# Patient Record
Sex: MALE | Race: OTHER
[De-identification: names, ages, dates, MRNs, and addresses within clinical notes are randomized per-mention and may not be internally consistent; named-entity substitution may affect disease eponyms.]

---

## 2020-07-20 ENCOUNTER — HOSPITAL ENCOUNTER (EMERGENCY)
Dept: HOSPITAL 8 - ED | Age: 50
LOS: 1 days | Discharge: HOME | End: 2020-07-21
Payer: COMMERCIAL

## 2020-07-20 VITALS — WEIGHT: 184.31 LBS | HEIGHT: 70 IN | BODY MASS INDEX: 26.39 KG/M2

## 2020-07-20 DIAGNOSIS — G45.8: Primary | ICD-10-CM

## 2020-07-20 DIAGNOSIS — R51: ICD-10-CM

## 2020-07-20 DIAGNOSIS — R94.31: ICD-10-CM

## 2020-07-20 DIAGNOSIS — G46.0: ICD-10-CM

## 2020-07-20 LAB
ALBUMIN SERPL-MCNC: 3.6 G/DL (ref 3.4–5)
ALP SERPL-CCNC: 75 U/L (ref 45–117)
ALT SERPL-CCNC: 32 U/L (ref 12–78)
ANION GAP SERPL CALC-SCNC: 9 MMOL/L (ref 5–15)
BASOPHILS # BLD AUTO: 0.15 X10^3/UL (ref 0–0.1)
BASOPHILS NFR BLD AUTO: 2 % (ref 0–1)
BILIRUB SERPL-MCNC: 0.6 MG/DL (ref 0.2–1)
CALCIUM SERPL-MCNC: 8.7 MG/DL (ref 8.5–10.1)
CHLORIDE SERPL-SCNC: 107 MMOL/L (ref 98–107)
CREAT SERPL-MCNC: 0.79 MG/DL (ref 0.7–1.3)
EOSINOPHIL # BLD AUTO: 0.16 X10^3/UL (ref 0–0.4)
EOSINOPHIL NFR BLD AUTO: 2 % (ref 1–7)
ERYTHROCYTE [DISTWIDTH] IN BLOOD BY AUTOMATED COUNT: 13.1 % (ref 9.4–14.8)
LYMPHOCYTES # BLD AUTO: 4.33 X10^3/UL (ref 1–3.4)
LYMPHOCYTES NFR BLD AUTO: 53 % (ref 22–44)
MCH RBC QN AUTO: 31.4 PG (ref 27.5–34.5)
MCHC RBC AUTO-ENTMCNC: 34.1 G/DL (ref 33.2–36.2)
MCV RBC AUTO: 92.3 FL (ref 81–97)
MD: NO
MONOCYTES # BLD AUTO: 0.78 X10^3/UL (ref 0.2–0.8)
MONOCYTES NFR BLD AUTO: 9 % (ref 2–9)
NEUTROPHILS # BLD AUTO: 2.83 X10^3/UL (ref 1.8–6.8)
NEUTROPHILS NFR BLD AUTO: 34 % (ref 42–75)
PLATELET # BLD AUTO: 370 X10^3/UL (ref 130–400)
PMV BLD AUTO: 7.7 FL (ref 7.4–10.4)
PROT SERPL-MCNC: 7.3 G/DL (ref 6.4–8.2)
RBC # BLD AUTO: 4.33 X10^6/UL (ref 4.38–5.82)
TROPONIN I SERPL-MCNC: < 0.015 NG/ML (ref 0–0.04)

## 2020-07-20 PROCEDURE — 71045 X-RAY EXAM CHEST 1 VIEW: CPT

## 2020-07-20 PROCEDURE — 80053 COMPREHEN METABOLIC PANEL: CPT

## 2020-07-20 PROCEDURE — 36415 COLL VENOUS BLD VENIPUNCTURE: CPT

## 2020-07-20 PROCEDURE — 80307 DRUG TEST PRSMV CHEM ANLYZR: CPT

## 2020-07-20 PROCEDURE — 70450 CT HEAD/BRAIN W/O DYE: CPT

## 2020-07-20 PROCEDURE — 99285 EMERGENCY DEPT VISIT HI MDM: CPT

## 2020-07-20 PROCEDURE — 93005 ELECTROCARDIOGRAM TRACING: CPT

## 2020-07-20 PROCEDURE — 84484 ASSAY OF TROPONIN QUANT: CPT

## 2020-07-20 PROCEDURE — 85025 COMPLETE CBC W/AUTO DIFF WBC: CPT

## 2020-07-20 NOTE — NUR
PT SLEEPING, EASILY ROUSED WITH VERBAL STIMULI. PT STATES HE'S "GOOD" WHEN 
ASKED HOW HE IS DOING. WIFE REMAINS AT BEDSIDE AND CALL LIGHT IS IN REACH. BED 
RAILS UPX2.

## 2020-07-20 NOTE — NUR
THIS PT PRESENTED TO THE ED FOR FULL FACE TINGLING. PT STATES HIS TOUNGE FEELS 
NUMB AND WHEN HE'S CHEWING HIS FOOD AT BOTH LUNCH AND DINNER SOME FOOD COMES 
OUT OF HIS MOUTH. PT STATES HE HAS MULTIPLE DRINKS DAILY, TODAY 2 BEERS AND "A 
VODKA JUICE." INITIAL RIGHT SIDE FACE DROOP NOTED BY THIS RN, NOT NOTED BY DOC. 
PT COMPLAINS OF MORE TINGLING ON LEFT SIDE UPPER AND LOWER EXTREMITIES. PT 
PLACED ON CARDIAC, BP AND O2 MONITORS.

## 2020-07-21 VITALS — SYSTOLIC BLOOD PRESSURE: 98 MMHG | DIASTOLIC BLOOD PRESSURE: 60 MMHG
